# Patient Record
Sex: MALE | Race: WHITE | NOT HISPANIC OR LATINO | URBAN - METROPOLITAN AREA
[De-identification: names, ages, dates, MRNs, and addresses within clinical notes are randomized per-mention and may not be internally consistent; named-entity substitution may affect disease eponyms.]

---

## 2017-07-31 ENCOUNTER — EMERGENCY (EMERGENCY)
Facility: HOSPITAL | Age: 62
LOS: 1 days | Discharge: HOME | End: 2017-07-31
Attending: EMERGENCY MEDICINE
Payer: COMMERCIAL

## 2017-07-31 VITALS
HEART RATE: 76 BPM | SYSTOLIC BLOOD PRESSURE: 144 MMHG | RESPIRATION RATE: 17 BRPM | OXYGEN SATURATION: 100 % | DIASTOLIC BLOOD PRESSURE: 94 MMHG | TEMPERATURE: 98 F

## 2017-07-31 VITALS
WEIGHT: 195.11 LBS | SYSTOLIC BLOOD PRESSURE: 152 MMHG | OXYGEN SATURATION: 95 % | HEART RATE: 91 BPM | RESPIRATION RATE: 18 BRPM | DIASTOLIC BLOOD PRESSURE: 90 MMHG | TEMPERATURE: 98 F | HEIGHT: 71 IN

## 2017-07-31 DIAGNOSIS — I83.91 ASYMPTOMATIC VARICOSE VEINS OF RIGHT LOWER EXTREMITY: ICD-10-CM

## 2017-07-31 DIAGNOSIS — I10 ESSENTIAL (PRIMARY) HYPERTENSION: ICD-10-CM

## 2017-07-31 DIAGNOSIS — I83.92 ASYMPTOMATIC VARICOSE VEINS OF LEFT LOWER EXTREMITY: ICD-10-CM

## 2017-07-31 DIAGNOSIS — Z88.0 ALLERGY STATUS TO PENICILLIN: ICD-10-CM

## 2017-07-31 PROCEDURE — 99284 EMERGENCY DEPT VISIT MOD MDM: CPT | Mod: 25

## 2017-07-31 PROCEDURE — 99284 EMERGENCY DEPT VISIT MOD MDM: CPT

## 2017-07-31 PROCEDURE — 93970 EXTREMITY STUDY: CPT | Mod: 26

## 2017-07-31 PROCEDURE — 93970 EXTREMITY STUDY: CPT

## 2017-07-31 NOTE — ED PROVIDER NOTE - EXTREMITY EXAM
b/l LE medial calf area tenderness, no swelling or erythema, superficial vericose veins noted.  no bony deformities, no leg length discrepancy, femoral and pedal pulses intact, cap refill  < 2 secs./right lower extremity findings/left lower extremity findings

## 2017-07-31 NOTE — ED PROVIDER NOTE - MEDICAL DECISION MAKING DETAILS
No DVT rported on US. Pt in no distress, states will f/u with his vascualr surgeon Dr. Joya.  Pt is well appearing walking with normal gait, stable for discharge and follow up with medical doctor. Pt educated on care and need for follow up. Discussed anticipatory guidance and return precautions. Questions answered. I had a detailed discussion with the patient and/or guardian regarding the historical points, exam findings, and any diagnostic results supporting the discharge diagnosis.

## 2017-07-31 NOTE — ED PROVIDER NOTE - OBJECTIVE STATEMENT
61 y/o male with significant PMHx of HTN, presents to the ED c/o b/l leg LE varicose veins x yesterday. Pt reports progressive b/l LE tenderness L>R. Pt denies SOB, CP, fever, chills, or any other complaints. Pt denies recent sick contacts, recent travel, long periods of immobilization or Hx of DVT/PE/CHF. Allergies: penicillin. PMD: Dr. Perez. Pharmacy: [id number] [facility]. Pt refuses analgesia. Currently taking: Flomax, Losartan

## 2017-09-20 NOTE — ED ADULT NURSE NOTE - CAS EDN DISCHARGE ASSESSMENT
Writer paged and spoke with Dr. Zimmerman regarding pt K+ of 5.6 this AM. New orders received for an EKG and kayexalate. Writer will continue to monitor.     Alert and oriented to person, place and time

## 2017-11-27 NOTE — ED ADULT NURSE NOTE - MUSCULOSKELETAL WDL
Patient seen and examined    Feels fine  no c/o CP SOB NV   No swelling feet  Hardin replaced yesterday- good UO    Vital Signs Last 24 Hrs  T(C): 36.7 (27 Nov 2017 16:41), Max: 36.9 (27 Nov 2017 00:38)  T(F): 98 (27 Nov 2017 16:41), Max: 98.4 (27 Nov 2017 00:38)  HR: 72 (27 Nov 2017 16:41) (72 - 86)  BP: 157/84 (27 Nov 2017 16:41) (157/84 - 161/88)  BP(mean): --  RR: 18 (27 Nov 2017 16:41) (18 - 18)  SpO2: 96% (27 Nov 2017 16:41) (95% - 96%)    PHYSICAL EXAM    GENERAL: NAD,   EYES:  conjunctiva and sclera clear  NECK: Supple, No JVD/Bruit  NERVOUS SYSTEM:  A/O x3,   CHEST:  CTA ,No rales or rhonchi  HEART:  RRR, No murmurs  ABDOMEN: Soft, NT/ND BS+  EXTREMITIES:  No Edema;  SKIN: No rashes    27 Nov 2017 08:54    148    |  112    |  30.0   ----------------------------<  144    3.4     |  22.0   |  1.91     Ca    10.6       27 Nov 2017 08:54          Na remains sl elevated - POFs  creat around 1.9 - at baseline ; Has cKD  check 24 H urines  Continue same treatment Full range of motion of upper and lower extremities, no joint tenderness/swelling.

## 2021-08-05 PROBLEM — I10 ESSENTIAL (PRIMARY) HYPERTENSION: Chronic | Status: ACTIVE | Noted: 2017-07-31

## 2021-08-11 ENCOUNTER — TRANSCRIPTION ENCOUNTER (OUTPATIENT)
Age: 66
End: 2021-08-11

## 2021-08-12 ENCOUNTER — RESULT REVIEW (OUTPATIENT)
Age: 66
End: 2021-08-12

## 2021-08-12 ENCOUNTER — OUTPATIENT (OUTPATIENT)
Dept: OUTPATIENT SERVICES | Facility: HOSPITAL | Age: 66
LOS: 1 days | Discharge: HOME | End: 2021-08-12
Payer: MEDICARE

## 2021-08-12 PROCEDURE — 88300 SURGICAL PATH GROSS: CPT | Mod: 26

## 2021-08-12 PROCEDURE — 88311 DECALCIFY TISSUE: CPT | Mod: 26

## 2021-08-12 PROCEDURE — 88305 TISSUE EXAM BY PATHOLOGIST: CPT | Mod: 26

## 2021-08-26 LAB — SURGICAL PATHOLOGY STUDY: SIGNIFICANT CHANGE UP
